# Patient Record
Sex: MALE | Race: WHITE | Employment: OTHER | URBAN - METROPOLITAN AREA
[De-identification: names, ages, dates, MRNs, and addresses within clinical notes are randomized per-mention and may not be internally consistent; named-entity substitution may affect disease eponyms.]

---

## 2018-09-22 ENCOUNTER — HOSPITAL ENCOUNTER (INPATIENT)
Facility: CLINIC | Age: 83
LOS: 2 days | Discharge: HOME | DRG: 390 | End: 2018-09-25
Attending: EMERGENCY MEDICINE | Admitting: HOSPITALIST
Payer: MEDICARE

## 2018-09-22 DIAGNOSIS — K56.609 SBO (SMALL BOWEL OBSTRUCTION) (H): ICD-10-CM

## 2018-09-22 LAB
ALBUMIN SERPL-MCNC: 4.3 G/DL (ref 3.4–5)
ALP SERPL-CCNC: 54 U/L (ref 40–150)
ALT SERPL W P-5'-P-CCNC: 24 U/L (ref 0–70)
ANION GAP SERPL CALCULATED.3IONS-SCNC: 9 MMOL/L (ref 3–14)
AST SERPL W P-5'-P-CCNC: 19 U/L (ref 0–45)
BASOPHILS # BLD AUTO: 0 10E9/L (ref 0–0.2)
BASOPHILS NFR BLD AUTO: 0.2 %
BILIRUB SERPL-MCNC: 0.7 MG/DL (ref 0.2–1.3)
BUN SERPL-MCNC: 19 MG/DL (ref 7–30)
CALCIUM SERPL-MCNC: 9.8 MG/DL (ref 8.5–10.1)
CHLORIDE SERPL-SCNC: 101 MMOL/L (ref 94–109)
CO2 SERPL-SCNC: 26 MMOL/L (ref 20–32)
CREAT SERPL-MCNC: 1.23 MG/DL (ref 0.66–1.25)
DIFFERENTIAL METHOD BLD: ABNORMAL
EOSINOPHIL # BLD AUTO: 0.1 10E9/L (ref 0–0.7)
EOSINOPHIL NFR BLD AUTO: 0.6 %
ERYTHROCYTE [DISTWIDTH] IN BLOOD BY AUTOMATED COUNT: 13.2 % (ref 10–15)
GFR SERPL CREATININE-BSD FRML MDRD: 56 ML/MIN/1.7M2
GLUCOSE SERPL-MCNC: 163 MG/DL (ref 70–99)
HCT VFR BLD AUTO: 47.7 % (ref 40–53)
HGB BLD-MCNC: 16.1 G/DL (ref 13.3–17.7)
IMM GRANULOCYTES # BLD: 0.1 10E9/L (ref 0–0.4)
IMM GRANULOCYTES NFR BLD: 0.5 %
LACTATE BLD-SCNC: 1.3 MMOL/L (ref 0.7–2)
LIPASE SERPL-CCNC: 165 U/L (ref 73–393)
LYMPHOCYTES # BLD AUTO: 1 10E9/L (ref 0.8–5.3)
LYMPHOCYTES NFR BLD AUTO: 5.2 %
MCH RBC QN AUTO: 32.1 PG (ref 26.5–33)
MCHC RBC AUTO-ENTMCNC: 33.8 G/DL (ref 31.5–36.5)
MCV RBC AUTO: 95 FL (ref 78–100)
MONOCYTES # BLD AUTO: 0.9 10E9/L (ref 0–1.3)
MONOCYTES NFR BLD AUTO: 4.8 %
NEUTROPHILS # BLD AUTO: 17.4 10E9/L (ref 1.6–8.3)
NEUTROPHILS NFR BLD AUTO: 88.7 %
NRBC # BLD AUTO: 0 10*3/UL
NRBC BLD AUTO-RTO: 0 /100
PLATELET # BLD AUTO: 295 10E9/L (ref 150–450)
POTASSIUM SERPL-SCNC: 3.9 MMOL/L (ref 3.4–5.3)
PROT SERPL-MCNC: 7.5 G/DL (ref 6.8–8.8)
RBC # BLD AUTO: 5.01 10E12/L (ref 4.4–5.9)
SODIUM SERPL-SCNC: 136 MMOL/L (ref 133–144)
WBC # BLD AUTO: 19.5 10E9/L (ref 4–11)

## 2018-09-22 PROCEDURE — 85025 COMPLETE CBC W/AUTO DIFF WBC: CPT | Performed by: EMERGENCY MEDICINE

## 2018-09-22 PROCEDURE — 80053 COMPREHEN METABOLIC PANEL: CPT | Performed by: EMERGENCY MEDICINE

## 2018-09-22 PROCEDURE — 83605 ASSAY OF LACTIC ACID: CPT | Performed by: EMERGENCY MEDICINE

## 2018-09-22 PROCEDURE — 96375 TX/PRO/DX INJ NEW DRUG ADDON: CPT

## 2018-09-22 PROCEDURE — 25000132 ZZH RX MED GY IP 250 OP 250 PS 637: Mod: GY | Performed by: EMERGENCY MEDICINE

## 2018-09-22 PROCEDURE — 96361 HYDRATE IV INFUSION ADD-ON: CPT

## 2018-09-22 PROCEDURE — A9270 NON-COVERED ITEM OR SERVICE: HCPCS | Mod: GY | Performed by: EMERGENCY MEDICINE

## 2018-09-22 PROCEDURE — 83690 ASSAY OF LIPASE: CPT | Performed by: EMERGENCY MEDICINE

## 2018-09-22 PROCEDURE — 96374 THER/PROPH/DIAG INJ IV PUSH: CPT

## 2018-09-22 PROCEDURE — 25000128 H RX IP 250 OP 636: Performed by: EMERGENCY MEDICINE

## 2018-09-22 PROCEDURE — 99285 EMERGENCY DEPT VISIT HI MDM: CPT | Mod: 25

## 2018-09-22 RX ORDER — ONDANSETRON 2 MG/ML
4 INJECTION INTRAMUSCULAR; INTRAVENOUS ONCE
Status: COMPLETED | OUTPATIENT
Start: 2018-09-22 | End: 2018-09-22

## 2018-09-22 RX ORDER — HYDROMORPHONE HCL/0.9% NACL/PF 0.2MG/0.2
0.2 SYRINGE (ML) INTRAVENOUS ONCE
Status: COMPLETED | OUTPATIENT
Start: 2018-09-22 | End: 2018-09-22

## 2018-09-22 RX ORDER — ACETAMINOPHEN 500 MG
1000 TABLET ORAL ONCE
Status: COMPLETED | OUTPATIENT
Start: 2018-09-22 | End: 2018-09-22

## 2018-09-22 RX ORDER — HYDROMORPHONE HCL/0.9% NACL/PF 0.2MG/0.2
0.2 SYRINGE (ML) INTRAVENOUS
Status: DISCONTINUED | OUTPATIENT
Start: 2018-09-22 | End: 2018-09-25 | Stop reason: HOSPADM

## 2018-09-22 RX ADMIN — Medication 0.2 MG: at 23:11

## 2018-09-22 RX ADMIN — ONDANSETRON 4 MG: 2 INJECTION INTRAMUSCULAR; INTRAVENOUS at 23:11

## 2018-09-22 RX ADMIN — ACETAMINOPHEN 1000 MG: 500 TABLET, FILM COATED ORAL at 23:10

## 2018-09-22 RX ADMIN — SODIUM CHLORIDE, POTASSIUM CHLORIDE, SODIUM LACTATE AND CALCIUM CHLORIDE 500 ML: 600; 310; 30; 20 INJECTION, SOLUTION INTRAVENOUS at 23:11

## 2018-09-22 NOTE — IP AVS SNAPSHOT
MRN:[id number]                      After Visit Summary   9/22/2018    Bryant Crawford    MRN: [id number]           Thank you!     Thank you for choosing [facility] for your care. Our goal is always to provide you with excellent care. Hearing back from our patients is one way we can continue to improve our services. Please take a few minutes to complete the written survey that you may receive in the mail after you visit. If you would like to speak to someone directly about your visit please contact Patient Relations at [phone]. Thank you!          Patient Information     Date Of Birth          1935        Designated Caregiver       Most Recent Value    Caregiver    Will someone help with your care after discharge? yes    Name of designated caregiver [facility] staff    Phone number of caregiver Home phone    Caregiver address [facility]      About your hospital stay     You were admitted on:  September 23, 2018 You last received care in the:  [place] [address] [facility]    You were discharged on:  September 25, 2018       Who to Call     For medical emergencies, please call 911.  For non-urgent questions about your medical care, please call your primary care provider or clinic, [phone]          Attending Provider     Provider Specialty    Kenn Franklin MD Emergency Medicine    [facility], Suzanna Douglas MD Internal Medicine       Primary Care Provider Office Phone # Fax #    Ariel Mario [phone]      After Care Instructions     Activity       Your activity upon discharge: activity as tolerated            Diet       Follow this diet upon discharge: Orders Placed This Encounter      Low Fiber Diet, for 4 days, if no abdominal pain/nausea/vomiting, may advance to regular diet.                  Follow-up Appointments     Follow-up and recommended labs and tests        Follow up with primary care provider, Ariel Mario, within 7  "days for hospital follow- up.  The following labs/tests are recommended: small bowel follow through and liver ultrasound.                  Further instructions from your care team       HOME CARE FOLLOWING BOWEL OBSTRUCTION ADMISSION  TUSHAR Golden, VERA Germain, OTF Katz     ACTIVITY:  Light Activity -- you may immediately be up and about as tolerated.  Driving -- you may drive when comfortable and off narcotic pain medications.  Light Work -- resume when comfortable off pain medications.  (If you can drive, you probably can work.)  Strenuous Work/Activity -- limit lifting to 15 pounds for 1-2 weeks.  Then, progressively increase with time.  Active Sports (running, biking, etc.) -- cautiously resume after 4 weeks, or when cleared by your surgeon.    DISCOMFORT:  Expect gradual improvement of residual abdominal soreness as your bowel function continues to return to normal over the following 1-2 weeks.  Please contact the office if you have worsening of abdominal pain, or onset of nausea/vomiting.    DIET:  Continue on a \"soft\" diet (i.e. cooked vegetables, soups, processed meats, light/white bread, mashed potatoes, rice, yogurt) for the first one to two week after discharge from the hospital.  After this time, you may return to diet you were on before surgery minimizing high cellulose foods and foods with \"skins\" (i.e.grapes, apple, citrus, corn) only.  This would include limiting: brussel sprouts, cabbage & kale, alfalfa sprouts, zucchini, squash, potatoes, carrots, and yams.  Drink plenty of fluids.    SURGEON CONTACT/APPOINTMENTS:  Office Phone:  [phone]     CONTACT US IF THE FOLLOWING DEVELOPS:   1. A fever that is above 101     2. Severe pain that is not relieved by your prescription.        If you have other questions, please call the office Monday thru Friday between 8am and 5pm to discuss with the nurse or physician assistant.  #[phone]    There is a surgeon ON CALL on " "weekday evenings and over the weekend in case of urgent need only, and may be contacted at the same number.    If you are having an emergency, call 911 or proceed to your nearest emergency department.    Pending Results     No orders found from 2018 to 2018.            Statement of Approval     Ordered          18 0822  I have reviewed and agree with all the recommendations and orders detailed in this document.  EFFECTIVE NOW     Approved and electronically signed by:  Dexter Washington MD             Admission Information     Date & Time Department Dept. Phone    2018 [place] [address] [facility] [phone]      Your Vitals Were     Blood Pressure Pulse Temperature Respirations Weight Pulse Oximetry    142/66 (BP Location: Left arm) 86 98.2  F (36.8  C) (Oral) 18 95.3 kg (210 lb 3.2 oz) 99%      MyChart Information     [employer] lets you send messages to your doctor, view your test results, renew your prescriptions, schedule appointments and more. To sign up, go to [email or web address] . Click on \"Log in\" on the left side of the screen, which will take you to the Welcome page. Then click on \"Sign up Now\" on the right side of the page.     You will be asked to enter the access code listed below, as well as some personal information. Please follow the directions to create your username and password.     Your access code is: [email or web address]  Expires: 2018 11:56 PM     Your access code will  in 90 days. If you need help or a new code, please call your [facility] clinic or [phone].        Care EveryWhere ID     This is your Care EveryWhere ID. This could be used by other organizations to access your [facility] medical records  [id number]        Equal Access to Services     JERILYN OLEA : Brandi Carranza, brandyn werner, janette byrne, morenita rodriguez. So [facility] [phone].    ATENCIÓN: Si habla español, tiene a drake disposición " servicios gratuitos de asistencia lingüística. [employer] [phone].    We comply with applicable federal civil rights laws and [place] laws. We do not discriminate on the basis of race, color, national origin, age, disability, sex, sexual orientation, or gender identity.               Review of your medicines      CONTINUE these medicines which have NOT CHANGED        Dose / Directions    aspirin 81 MG EC tablet        Dose:  81 mg   Take 81 mg by mouth daily   Refills:  0       hydrochlorothiazide 25 MG tablet   Commonly known as:  HYDRODIURIL        Dose:  25 mg   Take 25 mg by mouth daily.   Refills:  0       lisinopril 40 MG tablet   Commonly known as:  PRINIVIL/ZESTRIL        Dose:  40 mg   Take 40 mg by mouth daily.   Refills:  0                Protect others around you: Learn how to safely use, store and throw away your medicines at www.disposemymeds.org.             Medication List: This is a list of all your medications and when to take them. Check marks below indicate your daily home schedule. Keep this list as a reference.      Medications           Morning Afternoon Evening Bedtime As Needed    aspirin 81 MG EC tablet   Take 81 mg by mouth daily   Next Dose Due:  9/26                                   hydrochlorothiazide 25 MG tablet   Commonly known as:  HYDRODIURIL   Take 25 mg by mouth daily.   Next Dose Due:  9/26                                   lisinopril 40 MG tablet   Commonly known as:  PRINIVIL/ZESTRIL   Take 40 mg by mouth daily.   Next Dose Due:  9/26                                             More Information        Small Bowel Obstruction     Small bowel obstruction can lead to tissue damage and even tissue death.   A small bowel obstruction occurs when part or all of the small intestine (bowel) is blocked. As a result, digestive contents can t move through the bowel properly and out of the body. Treatment is needed right away to remove the blockage. This can ease painful symptoms. It  can also prevent serious problems, such as tissue death or bursting (rupture) of the small bowel. Without treatment, a small bowel obstruction can be fatal.  Causes of small bowel obstruction  A small bowel obstruction can be caused by:    Scar tissue (adhesions). These may form after belly (abdominal) surgery or an infection.    Hernia. A hernia is when an organ pushes through a weak spot or tear in the abdomen wall. Part of the small bowel can push out and be seen as a bulge under the belly. Hernias can also occur internally.    Certain health problems. These include when part of the bowel slides inside another part (intussusception). Other causes include irritable bowel disease such as Crohn s disease, and inflammation and sores in the intestine (ulcerative colitis).    Abnormal tissue growths (tumors). These can form on the inside or outside of the small bowel. They are usually due to cancer.  Symptoms of small bowel obstruction  Common symptoms include:    Belly cramping and pain    Belly swelling and bloating    Upset stomach (nausea) and vomiting    Can't  pass gas    Can't pass stool (constipation)    Diarrhea  Diagnosing small bowel obstruction  Your provider will ask about your symptoms and health history. You ll also have a physical exam. Tests may also be done to confirm the problem. These can include:    Imaging tests. These provide pictures of the small bowel. Common tests include X-rays and a CT scan.    Blood tests. These check for infection and other problems, such as excess fluid loss (dehydration).    Upper GI (gastrointestinal) series with a small bowel follow-through. This test takes X-rays of the upper digestive tract from the mouth through the small bowel. An X-ray dye (contrast fluid) is used. The dye coats the inside of your upper digestive tract so it will show up clearly on X-rays.  Treating small bowel obstruction  Treatment takes place in a hospital. As part of your care, the following  may be done:    No food or drink is given by mouth. This allows your bowels to rest.    An IV (intravenous) line is placed in a vein in your arm or hand. The IV line is used to give fluids. It may also be used to give medicines. These may be needed to ease pain, nausea, and other symptoms. They may also be needed to treat or prevent infections.    A soft, thin, flexible tube (nasogastric tube) is inserted through your nose and into your stomach. The tube is used to remove extra gas and fluid in your stomach and bowels. This helps to ease symptoms such as pain and swelling.    In severe cases, surgery is done. This may be needed if the small bowel is almost or totally blocked, or there is a hole in the bowel (bowel perforation). During surgery, the blockage is removed. Parts of the bowel may also be removed if there is tissue death. Other repair may be done as well, depending on what caused the blockage. Your healthcare provider will give you more information about surgery, if needed.    You ll be watched closely in the hospital until your symptoms improve. Your provider will tell you when you can go home.  Long-term concerns   After treatment, most people recover with no lasting effects. If a long part of the bowel is removed, there is a greater chance for lifelong digestive problems. Bowel movements may become irregular. Work with your provider to learn the best ways to manage any symptoms you may have, and to protect your health.  When to call your healthcare provider  Call your provider right away if you have any of the following:    Severe pain (call 911)    Belly swelling or cramping that won t go away    Can t pass stool or gas    Nausea or vomiting (especially if the vomit looks or smells like stool)   Date Last Reviewed: 7/1/2016 2000-2017 [employer]. [address] [place] [address], [place] [address]. All rights reserved. This information is not intended as a substitute for professional medical  care. Always follow your healthcare professional's instructions.

## 2018-09-22 NOTE — IP AVS SNAPSHOT
[place] [address] [facility]    [address]    [facility] [address]    Phone:  [phone]    Fax:  [phone]                                       After Visit Summary   9/22/2018    Bryant Crawford    MRN: [id number]           After Visit Summary Signature Page     I have received my discharge instructions, and my questions have been answered. I have discussed any challenges I see with this plan with the nurse or doctor.    ..........................................................................................................................................  Patient/Patient Representative Signature      ..........................................................................................................................................  Patient Representative Print Name and Relationship to Patient    ..................................................               ................................................  Date                                   Time    ..........................................................................................................................................  Reviewed by Signature/Title    ...................................................              ..............................................  Date                                               Time          22EPIC Rev 08/18

## 2018-09-23 ENCOUNTER — APPOINTMENT (OUTPATIENT)
Dept: CT IMAGING | Facility: CLINIC | Age: 83
DRG: 390 | End: 2018-09-23
Attending: EMERGENCY MEDICINE
Payer: MEDICARE

## 2018-09-23 PROBLEM — K56.609 SBO (SMALL BOWEL OBSTRUCTION) (H): Status: ACTIVE | Noted: 2018-09-23

## 2018-09-23 LAB — LACTATE BLD-SCNC: 1 MMOL/L (ref 0.7–2)

## 2018-09-23 PROCEDURE — 83605 ASSAY OF LACTIC ACID: CPT | Performed by: HOSPITALIST

## 2018-09-23 PROCEDURE — 36415 COLL VENOUS BLD VENIPUNCTURE: CPT | Performed by: HOSPITALIST

## 2018-09-23 PROCEDURE — 99223 1ST HOSP IP/OBS HIGH 75: CPT | Mod: AI | Performed by: HOSPITALIST

## 2018-09-23 PROCEDURE — 25000128 H RX IP 250 OP 636: Performed by: EMERGENCY MEDICINE

## 2018-09-23 PROCEDURE — 74177 CT ABD & PELVIS W/CONTRAST: CPT

## 2018-09-23 PROCEDURE — 25800025 ZZH RX 258: Performed by: HOSPITALIST

## 2018-09-23 PROCEDURE — 99221 1ST HOSP IP/OBS SF/LOW 40: CPT | Performed by: SURGERY

## 2018-09-23 PROCEDURE — 12000007 ZZH R&B INTERMEDIATE

## 2018-09-23 RX ORDER — ASPIRIN 81 MG/1
81 TABLET ORAL DAILY
COMMUNITY

## 2018-09-23 RX ORDER — PROCHLORPERAZINE MALEATE 5 MG
5 TABLET ORAL EVERY 6 HOURS PRN
Status: DISCONTINUED | OUTPATIENT
Start: 2018-09-23 | End: 2018-09-25 | Stop reason: HOSPADM

## 2018-09-23 RX ORDER — DEXTROSE MONOHYDRATE, SODIUM CHLORIDE, AND POTASSIUM CHLORIDE 50; 1.49; 4.5 G/1000ML; G/1000ML; G/1000ML
INJECTION, SOLUTION INTRAVENOUS CONTINUOUS
Status: DISCONTINUED | OUTPATIENT
Start: 2018-09-23 | End: 2018-09-25

## 2018-09-23 RX ORDER — NALOXONE HYDROCHLORIDE 0.4 MG/ML
.1-.4 INJECTION, SOLUTION INTRAMUSCULAR; INTRAVENOUS; SUBCUTANEOUS
Status: DISCONTINUED | OUTPATIENT
Start: 2018-09-23 | End: 2018-09-25 | Stop reason: HOSPADM

## 2018-09-23 RX ORDER — ONDANSETRON 4 MG/1
4 TABLET, ORALLY DISINTEGRATING ORAL EVERY 6 HOURS PRN
Status: DISCONTINUED | OUTPATIENT
Start: 2018-09-23 | End: 2018-09-25 | Stop reason: HOSPADM

## 2018-09-23 RX ORDER — ONDANSETRON 2 MG/ML
4 INJECTION INTRAMUSCULAR; INTRAVENOUS EVERY 6 HOURS PRN
Status: DISCONTINUED | OUTPATIENT
Start: 2018-09-23 | End: 2018-09-25 | Stop reason: HOSPADM

## 2018-09-23 RX ORDER — IOPAMIDOL 755 MG/ML
500 INJECTION, SOLUTION INTRAVASCULAR ONCE
Status: COMPLETED | OUTPATIENT
Start: 2018-09-23 | End: 2018-09-23

## 2018-09-23 RX ORDER — HYDROMORPHONE HYDROCHLORIDE 1 MG/ML
0.2 INJECTION, SOLUTION INTRAMUSCULAR; INTRAVENOUS; SUBCUTANEOUS
Status: DISCONTINUED | OUTPATIENT
Start: 2018-09-23 | End: 2018-09-25 | Stop reason: HOSPADM

## 2018-09-23 RX ORDER — PROCHLORPERAZINE 25 MG
12.5 SUPPOSITORY, RECTAL RECTAL EVERY 12 HOURS PRN
Status: DISCONTINUED | OUTPATIENT
Start: 2018-09-23 | End: 2018-09-25 | Stop reason: HOSPADM

## 2018-09-23 RX ORDER — BISACODYL 10 MG
10 SUPPOSITORY, RECTAL RECTAL DAILY PRN
Status: DISCONTINUED | OUTPATIENT
Start: 2018-09-23 | End: 2018-09-25 | Stop reason: HOSPADM

## 2018-09-23 RX ADMIN — IOPAMIDOL 100 ML: 755 INJECTION, SOLUTION INTRAVENOUS at 00:31

## 2018-09-23 RX ADMIN — POTASSIUM CHLORIDE, DEXTROSE MONOHYDRATE AND SODIUM CHLORIDE: 150; 5; 450 INJECTION, SOLUTION INTRAVENOUS at 03:13

## 2018-09-23 RX ADMIN — SODIUM CHLORIDE 65 ML: 9 INJECTION, SOLUTION INTRAVENOUS at 00:31

## 2018-09-23 RX ADMIN — POTASSIUM CHLORIDE, DEXTROSE MONOHYDRATE AND SODIUM CHLORIDE: 150; 5; 450 INJECTION, SOLUTION INTRAVENOUS at 13:30

## 2018-09-23 ASSESSMENT — ENCOUNTER SYMPTOMS
VOMITING: 1
NAUSEA: 1
CHILLS: 1
ABDOMINAL DISTENTION: 1
HEMATURIA: 0
DYSURIA: 0
ABDOMINAL PAIN: 1
COLOR CHANGE: 0

## 2018-09-23 ASSESSMENT — ACTIVITIES OF DAILY LIVING (ADL)
ADLS_ACUITY_SCORE: 9

## 2018-09-23 NOTE — ED NOTES
[facility] [place]  ED Nurse Handoff Report    Bryant Crawford is a 83 year old male   ED Chief complaint: Abdominal Pain  . ED Diagnosis:   Final diagnoses:   SBO (small bowel obstruction)     Allergies: No Known Allergies    Code Status: Full Code  Activity level - Baseline/Home:  Independent. Activity Level - Current:   Independent. Lift room needed: No. Bariatric: No   Needed: No   Isolation: No. Infection: Not Applicable.     Vital Signs:   Vitals:    09/22/18 2257 09/22/18 2351   BP: 156/89    Pulse: 86    Resp: 16    Temp: 98.2  F (36.8  C)    TempSrc: Oral    SpO2: 97%    Weight:  95.3 kg (210 lb)       Cardiac Rhythm:  ,      Pain level:    Patient confused: No. Patient Falls Risk: No.   Elimination Status: has not voided during ED visit   Patient Report - Initial Complaint: abdominal pain. Focused Assessment: lower abdominal pain with nausea  Tests Performed:   Labs Ordered and Resulted from Time of ED Arrival Up to the Time of Departure from the ED   CBC WITH PLATELETS DIFFERENTIAL - Abnormal; Notable for the following:        Result Value    WBC 19.5 (*)     Absolute Neutrophil 17.4 (*)     All other components within normal limits   COMPREHENSIVE METABOLIC PANEL - Abnormal; Notable for the following:     Glucose 163 (*)     GFR Estimate 56 (*)     All other components within normal limits   LIPASE   LACTIC ACID WHOLE BLOOD   ROUTINE UA WITH MICROSCOPIC   ISTAT CREATININE NURSING POCT   NASOGASTRIC TUBE DECOMPRESSION     Abd/pelvis CT,  IV  contrast only TRAUMA / AAA   Preliminary Result   IMPRESSION:   1. Mechanical small bowel obstruction with a transition point in the   ileum, near the inferior tip of the liver. The small bowel mucosa   immediately distal to the transition point is thickened. Small bowel   thickening at this point is nonspecific and may be related to edema or   enteritis. An obstructive lesion such as small bowel lymphoma cannot   be excluded.   2. Low-density lesion  in the left lobe of the liver may be the small   residual of a previous cyst in this region. Consider further   evaluation with ultrasound to rule out a solid liver lesion.         Abnormal Results: see above  Treatments provided: see MAR, NG tube insertion  Family Comments: not present  OBS brochure/video discussed/provided to patient:  N/A  ED Medications:   Medications   HYDROmorphone (DILAUDID) injection 0.2 mg (not administered)   lactated ringers BOLUS 500 mL (0 mLs Intravenous Stopped 9/23/18 0127)   ondansetron (ZOFRAN) injection 4 mg (4 mg Intravenous Given 9/22/18 2311)   acetaminophen (TYLENOL) tablet 1,000 mg (1,000 mg Oral Given 9/22/18 2310)   HYDROmorphone (DILAUDID) injection 0.2 mg (0.2 mg Intravenous Given 9/22/18 2311)   0.9% sodium chloride BOLUS (0 mLs Intravenous Stopped 9/23/18 0035)   iopamidol (ISOVUE-370) solution 500 mL (100 mLs Intravenous Given 9/23/18 0031)     Drips infusing:  No  For the majority of the shift, the patient's behavior Green. Interventions performed were none.     Severe Sepsis OR Septic Shock Diagnosis Present: No      ED Nurse Name/Phone Number: Ayden Moise,   1:40 AM    RECEIVING UNIT ED HANDOFF REVIEW    Above ED Nurse Handoff Report was reviewed: Yes  Reviewed by: Myesha Millard on September 23, 2018 at 2:24 AM

## 2018-09-23 NOTE — H&P
[facility]  Hospitalist Admission Note  Name: Bryant Crawford    MRN: [id number]  YOB: 1935    Age: 83 year old  Date of admission: 9/22/2018              Brief patient summary: Patient is a 83-year-old male with history of hypertension, CAD status post PCI in 1995 who presented on 9/23/2018 with abdominal pain and was found to have small bowel obstruction.         Assessment and Plan:   Small bowel obstruction: Patient developed acute onset abdominal pain and distention 14 hours prior to admission.  CT showed multiple dilated loops of small bowel with transition point in the ileum concerning for mechanical small bowel obstruction.  Patient has no history of any abdominal surgeries in the past, no history of bowel obstruction or bowel disease however chart review indicates prior bowel obstruction in 2008 and history of intermittent bowel obstructions thought to be secondary to colitis or enteritis are some congenital abnormality.  Surgery was consulted from the ER and recommended conservative management  --We will obtain formal surgery consult in the morning  --Continue NG tube decompression, patient feels much relieved with this  --N.p.o., IV fluids, antiemetics, pain control with IV Dilaudid as needed    Hypertension: Hold lisinopril 40 mg daily and hydrochlorothiazide 25 mg  History of coronary disease status post PCI to LAD in 1995: Holding all oral medications.  Not on a beta-blocker    DVT Prophylaxis: Pneumatic Compression Devices  Discharge Dispo: home  Estimated Disch Date / # of Days until Disch: Uncertain  Full Code    Chief Complaint: Abdominal pain and distention         History of Present Illness:   Discussed with ER physician : Dr. Franklin    Bryant Crawford is a 83 year old male who presents with abdominal pain since then in the morning associated with nausea and vomiting.  He also developed significant distention.  Patient reports having no prior history of small bowel  obstruction however chart review indicates he had admission for small bowel obstruction in 2008 and has had intermittent bouts of bowel obstructions even prior to that thought to be secondary to intermittent enteritis are possible undiagnosed congenital abnormality  Patient denies any fever, chills, diarrhea.  Had nausea which has now resolved with NG tube decompression         Past Medical History:     Patient Active Problem List   Diagnosis     BCC (basal cell carcinoma), face      Past Medical History:   Diagnosis Date     Hypertension                 Past Surgical History:     Past Surgical History:   Procedure Laterality Date     CARDIAC SURGERY                 Home Medications:     Prior to Admission medications    Medication Sig Last Dose Taking? Auth Provider   ASPIRIN PO Take 81 mg by mouth   Reported, Patient   hydrochlorothiazide (HYDRODIURIL) 25 MG tablet Take 25 mg by mouth daily.   Reported, Patient   lisinopril (PRINIVIL,ZESTRIL) 40 MG tablet Take 40 mg by mouth daily.   Reported, Patient            Allergies:   No Known Allergies         Social History:     Social History     Social History     Marital status:      Spouse name: N/A     Number of children: N/A     Years of education: N/A     Occupational History     Not on file.     Social History Main Topics     Smoking status: Former Smoker     Smokeless tobacco: Not on file     Alcohol use Yes      Comment: rare     Drug use: No     Sexual activity: Not on file     Other Topics Concern     Not on file     Social History Narrative                   Family History:   Father was diagnosed with colon cancer at the age of 95  Mother had CVA               Review of Systems:   The 10 point Review of Systems is negative other than noted in the HPI.               Physical Exam:     Heart Rate: 86, Blood pressure 118/71, pulse 86, temperature 98.2  F (36.8  C), temperature source Oral, resp. rate 16, weight 95.3 kg (210 lb), SpO2 93 %.  210 lbs 0  oz    General: Alert, awake, no acute distress.  Sleepy, NG tube in place  HEENT: NC/AT, eyes anicteric, external occular movements intact, face symmetric.  Dentition WNL, MM moist.  Cardiac: RRR, S1, S2.  No murmurs appreciated.  Pulmonary: Normal chest rise, normal work of breathing.  Lungs CTA BL  Abdomen: soft,distended.  Nontender, bowel Sounds absent.  No guarding.  Extremities: no deformities.  Warm, well perfused.  Skin: no rashes or lesions noted.  Warm and Dry.  Neuro: No focal deficits noted.  Speech clear.  Coordination and strength grossly normal.  Psych: Appropriate affect.         Data:   All new lab and imaging data was reviewed.    Recent Labs  Lab 09/22/18  2301   WBC 19.5*   HGB 16.1   HCT 47.7   MCV 95          Recent Labs  Lab 09/22/18  2301      POTASSIUM 3.9   CHLORIDE 101   CO2 26   ANIONGAP 9   *   BUN 19   CR 1.23   GFRESTIMATED 56*   GFRESTBLACK 68   ALIYA 9.8         Imaging:  Recent Results (from the past 48 hour(s))   Abd/pelvis CT,  IV  contrast only TRAUMA / AAA    Narrative    CT ABDOMEN/PELVIS WITH CONTRAST   9/23/2018 12:37 AM     HISTORY: Lower abdominal pain x12 hours, nausea and distention.     TECHNIQUE: CT images of the abdomen and pelvis following nonionic  intravenous contrast, 100 mL Isovue-370. Radiation dose for this scan  was reduced using automated exposure control, adjustment of the mA  and/or kV according to patient size, or iterative reconstruction  technique.    COMPARISON: 11/13/2008.    FINDINGS: 3 mm nodule at the left lung base is unchanged. Low-density  lesion in the left lobe of the liver measures 1.3 cm (series 2, image  17) and is in the area of a large cyst on the prior study. No other  liver lesions. There is trace perihepatic ascites. The gallbladder is  decompressed. The spleen, pancreas, and right adrenal gland are  unremarkable. Subcentimeter nodular thickening of the left adrenal  gland is stable. No hydronephrosis of either kidney.  Several  parapelvic cysts are noted. No evidence of solid renal mass. No  abdominal or retroperitoneal lymphadenopathy.    There are multiple dilated loops of small bowel. The colon is  decompressed. There is transition from dilated to nondilated small  bowel near the inferior tip of the liver (series 2, images 38-45).    There is sigmoid diverticulosis without evidence of diverticulitis. No  pelvic lymphadenopathy or free fluid. The prostate is enlarged.      Impression    IMPRESSION:  1. Mechanical small bowel obstruction with a transition point in the  ileum, near the inferior tip of the liver. The small bowel mucosa  immediately distal to the transition point is thickened. Small bowel  thickening at this point is nonspecific and may be related to edema or  enteritis. An obstructive lesion such as small bowel lymphoma cannot  be excluded.  2. Low-density lesion in the left lobe of the liver may be the small  residual of a previous cyst in this region. Consider further  evaluation with ultrasound to rule out a solid liver lesion.    MD Suzanna GARCIA MD  Hospitalist  [facility] [address] [place] [address] [phone]

## 2018-09-23 NOTE — PLAN OF CARE
Problem: Bowel Obstruction (Adult)  Goal: Signs and Symptoms of Listed Potential Problems Will be Absent, Minimized or Managed (Bowel Obstruction)  Signs and symptoms of listed potential problems will be absent, minimized or managed by discharge/transition of care (reference Bowel Obstruction (Adult) CPG).  Outcome: Improving  Pt alert and oriented X 4. Apical pulse regular. Lung sounds clear throughout all lobes. NG tube in place, brown drainage. Pt denies pain or nausea. Up ad steven, ambulating in hallways. 400cc brown drainage from NG this shift. Pt denies pain or nausea this shift.   Plan: Continue NG and bowel rest, IV fluids infusing, Abd Xray 9/24/18.     Pt reports passing small amount of flatus while walking hallways.

## 2018-09-23 NOTE — ED PROVIDER NOTES
History     Chief Complaint:  Abdominal Pain    HPI   Bryant Crawford is a 82 year old male with a history of hypertension and BCC who presents to the ED for evaluation of abdominal pain, gradually worsening and constant since 1000 this morning. He additionally has associated nausea and vomiting, and subjective chills. He feels distended as well. He denies any dysuria, hematuria, melena, or hematochezia. He has no history of abdominal surgery. He presented to the ED this evening due to the pain not improving on its own.    Allergies:  NKDA    Medications:    Aspirin  Hydrochlorothiazide  Lisinopril    Past Medical History:    HTN  BCC    Past Surgical History:    Cardiac Surgery    Family History:    No past pertinent family history.    Social History:  Marital Status:   [2]  Former Smoker  Rare alcohol use     Review of Systems   Constitutional: Positive for chills (subjective).   Gastrointestinal: Positive for abdominal distention, abdominal pain, nausea and vomiting.   Genitourinary: Negative for dysuria and hematuria.   Skin: Negative for color change.   All other systems reviewed and are negative.    Physical Exam     Patient Vitals for the past 24 hrs:   BP Temp Temp src Pulse Heart Rate Resp SpO2 Weight   09/23/18 0131 118/71 - - - - - 93 % -   09/22/18 2351 - - - - - - - 95.3 kg (210 lb)   09/22/18 2257 156/89 98.2  F (36.8  C) Oral 86 86 16 97 % -     Physical Exam   HENT:   Right Ear: External ear normal.   Left Ear: External ear normal.   Nose: Nose normal.   Eyes: Conjunctivae and lids are normal.   Neck: Neck supple. No tracheal deviation present.   Cardiovascular: Regular rhythm and intact distal pulses.    Pulmonary/Chest: Breath sounds normal. No respiratory distress.   Abdominal: Soft. He exhibits distension (mild). There is tenderness (+ ttp mid abd and epigastrium, no peritonitis). There is no rebound and no guarding.   Musculoskeletal:   No peripheral edema   Neurological:   MAEE, no  gross focal motor or sensory deficit   Skin: Skin is warm and dry. He is not diaphoretic.   Psychiatric: He has a normal mood and affect.   Nursing note and vitals reviewed.        Emergency Department Course     Imaging:  Radiographic findings were communicated with the patient who voiced understanding of the findings.  CT Abdomen/Pelvis with IV contrast:   1. Mechanical small bowel obstruction with a transition point in the ileum, near the inferior tip of the liver. The small bowel mucosa immediately distal to the transition point is thickened. Small bowel thickening at this point is nonspecific and may be related to edema or enteritis. An obstructive lesion such as small bowel lymphoma cannot be excluded.  2. Low-density lesion in the left lobe of the liver may be the small residual of a previous cyst in this region. Consider further evaluation with ultrasound to rule out a solid liver lesion, as per radiology.    Laboratory:  CBC: WBC: 19.5 (H), HGB: 16.1, PLT: 295  CMP: Glucose 163 (H), GFR 56 (L), o/w WNL (Creatinine: 1.23)    Lipase: 165    2301 Lactic acid: 1.3    UA with Microscopic: pending on admission    Interventions:  2310 Tylenol 1000 mg PO  2311 LR 0.5L IV   Zofran, 4 mg, IV injection   Dilaudid, 0.2 mg, IV injection  Please see MAR for full list of medications administered in the ED.    Emergency Department Course:  Nursing notes and vitals reviewed. (2300) I performed an exam of the patient as documented above.     IV inserted. Medicine administered as documented above. Blood drawn. This was sent to the lab for further testing, results above.    The patient provided a urine sample here in the emergency department. This was sent for laboratory testing, findings above.     The patient was sent for an Abdomen/Pelivs CT while in the emergency department, findings above.     (0115) I rechecked the patient and discussed the results of his workup thus far.     (0118) I consulted with Dr. Jones, Surgery,  regarding the patient's history and presentation here in the emergency department.    NG tube placed here in the ED.    (0142)  I consulted with Dr. Hernandez of the hospitalist services. They are in agreement to accept the patient for admission.    Findings and plan explained to the Patient who consents to admission. Discussed the patient with Dr. Hernandez, who will admit the patient to a bed for further monitoring, evaluation, and treatment.    Impression & Plan      Medical Decision Making:  Bryant Crawford is a 83 year old male who presents with abdominal pain. His workup here is consistent with a small bowel obstruction. There is no evidence of perforation. There is a small amount of free fluid. Leukocytosis but normal lactic acid. We will plan for admission here to the hospital. No significant gastric distention.    I spoke with the general surgeon, who looked at the CT scan. He would prefer an NG tube be placed, which will be done and he will be admitted to the hospitalist service. No emergent surgical intervention is planned at this time.    Diagnosis:    ICD-10-CM    1. SBO (small bowel obstruction) K56.609      Disposition:  Admitted to Dr. Hernandez    Scribe Disclosure:  I, Stephanie Augustine, am serving as a scribe on 9/22/2018 at 10:59 PM to personally document services performed by Kenn Franklin, * based on my observations and the provider's statements to me.     Stephanie Augustine  9/22/2018   [facility] EMERGENCY DEPARTMENT       Kenn Franklin MD  09/23/18 0702

## 2018-09-23 NOTE — CONSULTS
Chief complaint:  Abdominal pain    HPI:  This patient is a 83 year old male with a history of bowel obstructions, most recently in 2008 who presents with abdominal pain.  This began about 24 hours ago.  The patient has had no abdominal surgeries in the past.  He was seen here in 2008 by Dr. Stanton for small bowel obstruction, but that resolved nonsurgically.  The question has been raised whether he might have some sort of enteritis or Crohn's disease.  The patient denies any recent problems.  His pain has now almost completely resolved.    Past Medical History:   has a past medical history of Hypertension.    Past Surgical History:  Past Surgical History:   Procedure Laterality Date     CARDIAC SURGERY          Social History:  Social History     Social History     Marital status:      Spouse name: N/A     Number of children: N/A     Years of education: N/A     Occupational History     Not on file.     Social History Main Topics     Smoking status: Former Smoker     Smokeless tobacco: Not on file     Alcohol use Yes      Comment: rare     Drug use: No     Sexual activity: Not on file     Other Topics Concern     Not on file     Social History Narrative        Family History:  No family history on file.    Review of Systems:  The 10 point Review of Systems is negative other than noted in the HPI and above.    Physical Exam:  General - This is a well developed, well nourished male in no apparent distress.  HEENT - Normocephalic, atraumatic.  No scleral icterus.  Neck - supple without masses  Lungs - clear to ascultation.    Heart - Regular rate & rhythm without murmur  Abdomen:  Soft and nontender.  Bowel sounds are normal.  Extremities - warm without edema  Neurologic - nonfocal  Skin shows no evidence of jaundice.  The patient is alert and oriented ×3.    Relevant labs:  White blood cell count last evening was elevated at 19,500.  Lactic acid was normal at 1.0.    Imaging:  CT scan shows what appears to be a  small bowel obstruction with a possible transition point laterally on the right.  There are couple of areas where the small bowel looked somewhat thickened.  There was significant dilatation of the small bowel centrally.    Assessment and Plan:  This is a patient with a fairly significant appearing small bowel obstruction on CT scan.  The patient initially had fairly severe pain, but this has now completely resolved.  Given the resolution of the patient's symptoms, I don't think there is any urgent need for surgical intervention.  Past for GI with small bowel follow-through studies apparently were negative.  I do think it might be worth repeating that once the patient has resolved his current symptoms.  If he does not resolve and get to the point where he can be eating in the next couple of days, I think we might need to consider surgical intervention.  At this point, however, it appears that he has resolved the cause of his pain.  I'm hopeful that he will start passing gas and stool within the next day or so.  We will follow the patient with you.    Julian Jones MD  [facility]

## 2018-09-23 NOTE — PLAN OF CARE
Problem: Patient Care Overview  Goal: Plan of Care/Patient Progress Review  Outcome: Improving  09/23/18 3:53 AM  Pt arrived to the floor from the ED via stretcher.  Ambulated to the bed independently.  Denies pain. VSS.  A&Ox4.   NG tube in place.  Hooked him up to low continuous suction.  Completed profile.  Started IVF to right forearm.  Lungs clear.  Hypoactive BS, last BM 9/22/18.  NPO, can have ice chips.  Urinal at bedside for pt to void.  Will be SBA due to NG tube.  Plan:  Collect UA, IVF, NPO, NG tube, General Surg consult.  Comes from [facility] where he lives with his wife.  Will continue to monitor.      09/23/18 5:50 AM  Pt refusing the flu vaccine.  States he'll get it at [facility].

## 2018-09-23 NOTE — PHARMACY-ADMISSION MEDICATION HISTORY
Admission medication history interview status for this patient is complete. See Psychiatric admission navigator for allergy information, prior to admission medications and immunization status.     Medication history interview source(s):Patient  Medication history resources (including written lists, pill bottles, clinic record): Chart Review - [facility] Care Everywhere, SureScripts fill history.  Primary pharmacy: [facility] [place], [place] - [address] [address]    Changes made to PTA medication list:  Added: None  Deleted: None  Changed: None    Actions taken by pharmacist (provider contacted, etc): None     Additional medication history information: None    Medication reconciliation/reorder completed by provider prior to medication history? No    Do you take OTC medications (eg tylenol, ibuprofen, fish oil, eye/ear drops, etc)? Patient denies use of OTCs, herbal/vitamin supplements.       Prior to Admission medications    Medication Sig Last Dose Taking? Auth Provider   aspirin 81 MG EC tablet Take 81 mg by mouth daily 9/22/2018 at AM Yes Unknown, Entered By History   hydrochlorothiazide (HYDRODIURIL) 25 MG tablet Take 25 mg by mouth daily. 9/22/2018 at AM Yes Reported, Patient   lisinopril (PRINIVIL,ZESTRIL) 40 MG tablet Take 40 mg by mouth daily. 9/22/2018 at AM Yes Reported, Patient         Adonay Martin, PharmD./PGY-1 Resident

## 2018-09-23 NOTE — ED NOTES
Bed: ED08  Expected date: 9/22/18  Expected time: 10:46 PM  Means of arrival: Ambulance  Comments:  [phone]

## 2018-09-24 ENCOUNTER — APPOINTMENT (OUTPATIENT)
Dept: GENERAL RADIOLOGY | Facility: CLINIC | Age: 83
DRG: 390 | End: 2018-09-24
Attending: SURGERY
Payer: MEDICARE

## 2018-09-24 LAB
ALBUMIN SERPL-MCNC: 3.2 G/DL (ref 3.4–5)
ALP SERPL-CCNC: 43 U/L (ref 40–150)
ALT SERPL W P-5'-P-CCNC: 15 U/L (ref 0–70)
ANION GAP SERPL CALCULATED.3IONS-SCNC: 4 MMOL/L (ref 3–14)
AST SERPL W P-5'-P-CCNC: 11 U/L (ref 0–45)
BASOPHILS # BLD AUTO: 0 10E9/L (ref 0–0.2)
BASOPHILS NFR BLD AUTO: 0.2 %
BILIRUB DIRECT SERPL-MCNC: 0.2 MG/DL (ref 0–0.2)
BILIRUB SERPL-MCNC: 0.6 MG/DL (ref 0.2–1.3)
BUN SERPL-MCNC: 12 MG/DL (ref 7–30)
CALCIUM SERPL-MCNC: 8.4 MG/DL (ref 8.5–10.1)
CHLORIDE SERPL-SCNC: 105 MMOL/L (ref 94–109)
CO2 SERPL-SCNC: 30 MMOL/L (ref 20–32)
CREAT SERPL-MCNC: 1.21 MG/DL (ref 0.66–1.25)
DIFFERENTIAL METHOD BLD: ABNORMAL
EOSINOPHIL # BLD AUTO: 0.1 10E9/L (ref 0–0.7)
EOSINOPHIL NFR BLD AUTO: 0.8 %
ERYTHROCYTE [DISTWIDTH] IN BLOOD BY AUTOMATED COUNT: 13.1 % (ref 10–15)
GFR SERPL CREATININE-BSD FRML MDRD: 58 ML/MIN/1.7M2
GLUCOSE SERPL-MCNC: 114 MG/DL (ref 70–99)
HCT VFR BLD AUTO: 42.9 % (ref 40–53)
HGB BLD-MCNC: 14.1 G/DL (ref 13.3–17.7)
IMM GRANULOCYTES # BLD: 0.1 10E9/L (ref 0–0.4)
IMM GRANULOCYTES NFR BLD: 0.5 %
INR PPP: 1.12 (ref 0.86–1.14)
LYMPHOCYTES # BLD AUTO: 1.2 10E9/L (ref 0.8–5.3)
LYMPHOCYTES NFR BLD AUTO: 9.4 %
MCH RBC QN AUTO: 32 PG (ref 26.5–33)
MCHC RBC AUTO-ENTMCNC: 32.9 G/DL (ref 31.5–36.5)
MCV RBC AUTO: 98 FL (ref 78–100)
MONOCYTES # BLD AUTO: 1.1 10E9/L (ref 0–1.3)
MONOCYTES NFR BLD AUTO: 8.6 %
NEUTROPHILS # BLD AUTO: 10.4 10E9/L (ref 1.6–8.3)
NEUTROPHILS NFR BLD AUTO: 80.5 %
NRBC # BLD AUTO: 0 10*3/UL
NRBC BLD AUTO-RTO: 0 /100
PLATELET # BLD AUTO: 253 10E9/L (ref 150–450)
POTASSIUM SERPL-SCNC: 4.2 MMOL/L (ref 3.4–5.3)
PROT SERPL-MCNC: 6 G/DL (ref 6.8–8.8)
RBC # BLD AUTO: 4.4 10E12/L (ref 4.4–5.9)
SODIUM SERPL-SCNC: 139 MMOL/L (ref 133–144)
WBC # BLD AUTO: 13 10E9/L (ref 4–11)

## 2018-09-24 PROCEDURE — 85610 PROTHROMBIN TIME: CPT | Performed by: HOSPITALIST

## 2018-09-24 PROCEDURE — 85025 COMPLETE CBC W/AUTO DIFF WBC: CPT | Performed by: HOSPITALIST

## 2018-09-24 PROCEDURE — 74019 RADEX ABDOMEN 2 VIEWS: CPT

## 2018-09-24 PROCEDURE — 12000007 ZZH R&B INTERMEDIATE

## 2018-09-24 PROCEDURE — 25800025 ZZH RX 258: Performed by: HOSPITALIST

## 2018-09-24 PROCEDURE — 80076 HEPATIC FUNCTION PANEL: CPT | Performed by: HOSPITALIST

## 2018-09-24 PROCEDURE — 80048 BASIC METABOLIC PNL TOTAL CA: CPT | Performed by: HOSPITALIST

## 2018-09-24 PROCEDURE — 99232 SBSQ HOSP IP/OBS MODERATE 35: CPT | Performed by: INTERNAL MEDICINE

## 2018-09-24 PROCEDURE — 36415 COLL VENOUS BLD VENIPUNCTURE: CPT | Performed by: HOSPITALIST

## 2018-09-24 PROCEDURE — 99231 SBSQ HOSP IP/OBS SF/LOW 25: CPT | Performed by: PHYSICIAN ASSISTANT

## 2018-09-24 RX ADMIN — POTASSIUM CHLORIDE, DEXTROSE MONOHYDRATE AND SODIUM CHLORIDE: 150; 5; 450 INJECTION, SOLUTION INTRAVENOUS at 10:34

## 2018-09-24 RX ADMIN — POTASSIUM CHLORIDE, DEXTROSE MONOHYDRATE AND SODIUM CHLORIDE: 150; 5; 450 INJECTION, SOLUTION INTRAVENOUS at 20:55

## 2018-09-24 RX ADMIN — POTASSIUM CHLORIDE, DEXTROSE MONOHYDRATE AND SODIUM CHLORIDE: 150; 5; 450 INJECTION, SOLUTION INTRAVENOUS at 00:18

## 2018-09-24 ASSESSMENT — ACTIVITIES OF DAILY LIVING (ADL)
ADLS_ACUITY_SCORE: 9

## 2018-09-24 NOTE — PLAN OF CARE
Problem: Patient Care Overview  Goal: Plan of Care/Patient Progress Review  Outcome: Improving  Patient resting comfortably in bed. Vital signs stable. Alert/oriented x4. Denies pain/discomfort. Ambulating independently. Voiding without difficulty. Hypoactive bowel sounds. Patient reports passing gas. NG to LIS with moderate amount of green/brown output. IVF infusing. Continue supportive cares.

## 2018-09-24 NOTE — PLAN OF CARE
Problem: Patient Care Overview  Goal: Plan of Care/Patient Progress Review  Outcome: No Change  A&O x 4, independent, NPO, LS clear, IV fluids 100 mL/hr, NG tube, no pain, ambulated in hallway, will continue to monitor.

## 2018-09-24 NOTE — PROGRESS NOTES
[facility]   General Surgery Progress Note         Assessment and Plan:   Assessment:   Admission for SBO, conservative management      Plan:   -Await morning AXR  -Trial NGT clamping, possible removal later today  -Continue NPO  -Increase activity as able  -Await further return of bowel function         Interval History:   Sitting up in chair, doing well. Denies pain, has not needed pain medication. Started passing flatus yesterday and continues this morning. He wants the NGT removed. He is up walking. Voiding independently.          Physical Exam:   Blood pressure 106/58, pulse 86, temperature 97.7  F (36.5  C), temperature source Oral, resp. rate 16, weight 95.6 kg (210 lb 12.8 oz), SpO2 98 %.    I/O last 3 completed shifts:  In: 3054.33 [I.V.:3054.33]  Out: 400 [Emesis/NG output:400]    Abdomen: soft, very mildly distended, NT, +BS           Data:     Recent Labs   Lab Test  09/24/18   0624  09/22/18   2301  05/16/16   1920   HGB  14.1  16.1  14.4   WBC  13.0*  19.5*  15.3*          Lc Diallo PA-C

## 2018-09-24 NOTE — PROGRESS NOTES
[facility]  Hospitalist Progress Note  Name: Bryant Crawford    MRN: [id number]  Provider:  Dexter Washington MD  09/24/18    Initial presenting complaint/issue to hospital (Diagnosis): SBO.         Assessment and Plan:      Summary of Stay: Bryant Crawford is a 83 year old male admitted on 9/22/2018 with history of hypertension, CAD status post PCI in 1995 who presented on 9/23/2018 with abdominal pain and was found to have small bowel obstruction.       Problem List:     Small bowel obstruction: Patient developed acute onset abdominal pain and distention 14 hours prior to admission.  CT showed multiple dilated loops of small bowel with transition point in the ileum concerning for mechanical small bowel obstruction.  Patient has no history of any abdominal surgeries in the past, no history of bowel obstruction or bowel disease however chart review indicates prior bowel obstruction in 2008 and history of intermittent bowel obstructions thought to be secondary to colitis or enteritis are some congenital abnormality.  Surgery was consulted from the ER and recommended conservative management  -- NG clamped and tolerating well.  -- AXR today.  -- Surgery following.     Hypertension: Hold lisinopril 40 mg daily and hydrochlorothiazide 25 mg  History of coronary disease status post PCI to LAD in 1995: Holding all oral medications.  Not on a beta-blocker     DVT Prophylaxis: Pneumatic Compression Devices  Discharge Dispo: home  Estimated Disch Date / # of Days until Disch: 1 day  Full Code    # Pain Assessment:  Current Pain Score 9/24/2018   Patient currently in pain? denies   Pain score (0-10) -   Bryant monreal pain level was assessed and he currently denies pain.                Interval History:        No chest pain/SOB/N/V/fever/chills. No abdominal pain. + flatus.                  Physical Exam:      Last Vital Signs:  Temp: 97.7  F (36.5  C) Temp src: Oral BP: 134/60   Heart Rate: 81 Resp: 16 SpO2: 90 % O2  Device: None (Room air)      Intake/Output Summary (Last 24 hours) at 09/24/18 0736  Last data filed at 09/24/18 0018   Gross per 24 hour   Intake          3054.33 ml   Output              400 ml   Net          2654.33 ml     I/O last 3 completed shifts:  In: 3054.33 [I.V.:3054.33]  Out: 400 [Emesis/NG output:400]  Vitals:    09/22/18 2351 09/23/18 0251 09/24/18 0415   Weight: 95.3 kg (210 lb) 95.6 kg (210 lb 12.8 oz) 95.6 kg (210 lb 12.8 oz)       Gen - AAO x 3 in NAD.  Lungs - CTA B.  Heart - RR,S1+S2 nml, no m/g/r.  Abd - soft, NT, ND, + BS.  Ext - no edema.         Medications:      All current medications were reviewed.         Data:      All new lab and imaging data was reviewed.   Labs:  No results for input(s): CULT in the last 168 hours.    Recent Labs  Lab 09/24/18  0624 09/22/18  2301   WBC 13.0* 19.5*   HGB 14.1 16.1   HCT 42.9 47.7   MCV 98 95    295       Recent Labs  Lab 09/24/18  0624 09/22/18  2301    136   POTASSIUM 4.2 3.9   CHLORIDE 105 101   CO2 30 26   ANIONGAP 4 9   * 163*   BUN 12 19   CR 1.21 1.23   GFRESTIMATED 58* 56*   GFRESTBLACK 70 68   ALIYA 8.4* 9.8   PROTTOTAL 6.0* 7.5   ALBUMIN 3.2* 4.3   BILITOTAL 0.6 0.7   ALKPHOS 43 54   AST 11 19   ALT 15 24      Recent Imaging:   No results found for this or any previous visit (from the past 24 hour(s)).

## 2018-09-24 NOTE — PLAN OF CARE
Problem: Patient Care Overview  Goal: Plan of Care/Patient Progress Review  Outcome: Improving  A&Ox4. VSS. /hr. Up - independently. NG tube clamped for trial of 4 hrs - residual checked for 0 mL - NG tube now removed. Denies pain and nausea. One bowel movement this afternoon. Diet - clear liquid. Possible discharge in 1 day.

## 2018-09-25 VITALS
HEART RATE: 86 BPM | OXYGEN SATURATION: 99 % | SYSTOLIC BLOOD PRESSURE: 142 MMHG | TEMPERATURE: 98.2 F | WEIGHT: 210.2 LBS | DIASTOLIC BLOOD PRESSURE: 66 MMHG | RESPIRATION RATE: 18 BRPM

## 2018-09-25 PROCEDURE — 25800025 ZZH RX 258: Performed by: HOSPITALIST

## 2018-09-25 PROCEDURE — 99238 HOSP IP/OBS DSCHRG MGMT 30/<: CPT | Performed by: INTERNAL MEDICINE

## 2018-09-25 PROCEDURE — 99231 SBSQ HOSP IP/OBS SF/LOW 25: CPT | Performed by: PHYSICIAN ASSISTANT

## 2018-09-25 RX ADMIN — POTASSIUM CHLORIDE, DEXTROSE MONOHYDRATE AND SODIUM CHLORIDE: 150; 5; 450 INJECTION, SOLUTION INTRAVENOUS at 06:46

## 2018-09-25 ASSESSMENT — ACTIVITIES OF DAILY LIVING (ADL)
ADLS_ACUITY_SCORE: 9

## 2018-09-25 NOTE — PROGRESS NOTES
[facility]   General Surgery Progress Note         Assessment and Plan:   Assessment:   Admission for SBO, conservative management  resolved      Plan:   -Diet advancement per hospitalist  -IF tolerates advancement to low residue, OK to discharge today from a surgical standpoint per hospitalist.   -Discharge instructions reviewed. No need for general surgery followup.         Interval History:   Up walking in room. Denies pain. Tolerating full liquid diet, denies nausea/vomiting or bloating. +flatus, +BM. Up walking.          Physical Exam:   Blood pressure 142/66, pulse 86, temperature 98.2  F (36.8  C), temperature source Oral, resp. rate 18, weight 95.3 kg (210 lb 3.2 oz), SpO2 99 %.    I/O last 3 completed shifts:  In: 1914 [P.O.:400; I.V.:1514]  Out: -     Abdomen: soft, non distended, NT, +BS           Data:     Recent Labs   Lab Test  09/24/18   0624  09/22/18   2301  05/16/16   1920   HGB  14.1  16.1  14.4   WBC  13.0*  19.5*  15.3*       Tri Funk PA-C     Seen and agree,    Julian Jones MD  [facility]

## 2018-09-25 NOTE — PLAN OF CARE
A&O X4. Up independent, ambulating halls. VSS on RA. Denies pain, nausea. Tolerating low fiber diet, passing flatus with active bowel sounds. GI signed off. Plan to discharge this afternoon with no changes in medications. Education provided alongside follow up instructions. All questions answered, belongings sent.

## 2018-09-25 NOTE — PLAN OF CARE
Problem: Patient Care Overview  Goal: Plan of Care/Patient Progress Review  Outcome: Improving  21:25  Pain: No c/o pain.   LOC: alert and oriented  Mobility: Independent  Lungs: clear.  95% RA  Tele: No tele  GI: rounded abdomen.  Soft, non-tender.  + bowel sounds.  Passing gas.  Had a bowel movement on day shift today. Tolerated clear liquids for supper.   : voiding without difficulty  IV: PIV infusing maintenance fluids  Other: Probable discharge home tomorrow.

## 2018-09-25 NOTE — DISCHARGE SUMMARY
[facility]  Discharge Summary        Bryant Crawford MRN# [id number]   YOB: 1935 Age: 83 year old     Date of Admission:  9/22/2018  Date of Discharge:  9/25/2018  Admitting Physician:  Suzanna Hernandez MD  Discharge Physician: Dexter Washington MD  Discharging Service: Hospitalist     Primary Provider: Ariel Mario  Primary Care Physician Phone Number: [phone]         Discharge Diagnoses/Problem Oriented Hospital Course (Providers):    Bryant Crawford was admitted on 9/22/2018 by Suzanna Hernandez MD and I would refer you to their history and physical.  The following problems were addressed during his hospitalization:    Small bowel obstruction: Patient developed acute onset abdominal pain and distention 14 hours prior to admission.  CT showed multiple dilated loops of small bowel with transition point in the ileum concerning for mechanical small bowel obstruction.  Patient has no history of any abdominal surgeries in the past, no history of bowel obstruction or bowel disease however chart review indicates prior bowel obstruction in 2008 and history of intermittent bowel obstructions thought to be secondary to colitis or enteritis are some congenital abnormality.  Surgery was consulted from the ER and recommended conservative management  -- NG clamped 9/24 and tolerating well.  -- Advanced diet to full liquid and subsequently low residue.     Hypertension: Hold lisinopril 40 mg daily and hydrochlorothiazide 25 mg  History of coronary disease status post PCI to LAD in 1995: Holding all oral medications.  Not on a beta-blocker     Admitted as inpatient. Rx conservatively with NG to LIS, IVF's and IV pain meds. Symptoms improved, with + BM's. Tolerating PO. F/u with PCP for liver cyst and small bowel follow through.        Code Status:      Full Code        Brief Hospital Stay Summary Sent Home With Patient in AVS:                Important Results:      CT A/P.          Pending Results:        Unresulted Labs Ordered in the Past 30 Days of this Admission     No orders found from 7/24/2018 to 9/23/2018.            Discharge Instructions and Follow-Up:      Follow-up Appointments     Follow-up and recommended labs and tests        Follow up with primary care provider, Ariel Mario, within 7 days for   hospital follow- up.  The following labs/tests are recommended: small   bowel follow through.                      Discharge Disposition:      Discharged to home         Discharge Medications:        Current Discharge Medication List      CONTINUE these medications which have NOT CHANGED    Details   aspirin 81 MG EC tablet Take 81 mg by mouth daily      hydrochlorothiazide (HYDRODIURIL) 25 MG tablet Take 25 mg by mouth daily.      lisinopril (PRINIVIL,ZESTRIL) 40 MG tablet Take 40 mg by mouth daily.               Allergies:       No Known Allergies        Consultations This Hospital Stay:      Consultation during this admission received from surgery         Condition and Physical on Discharge:      Discharge condition: Stable   Vitals: Blood pressure 142/66, pulse 86, temperature 98.2  F (36.8  C), temperature source Oral, resp. rate 18, weight 95.3 kg (210 lb 3.2 oz), SpO2 99 %.  210 lbs 3.2 oz      Gen - AAO x 3 in NAD.  Lungs - CTA B.  Heart - RR,S1+S2 nml, no m/g/r.  Abd - soft, NT, ND, + BS.  Ext - no edema.         Discharge Time:        < 30 mins.        Image Results From This Hospital Stay (For Non-EPIC Providers):        Results for orders placed or performed during the hospital encounter of 09/22/18   Abd/pelvis CT,  IV  contrast only TRAUMA / AAA    Narrative    CT ABDOMEN/PELVIS WITH CONTRAST   9/23/2018 12:37 AM     HISTORY: Lower abdominal pain x12 hours, nausea and distention.     TECHNIQUE: CT images of the abdomen and pelvis following nonionic  intravenous contrast, 100 mL Isovue-370. Radiation dose for this scan  was reduced using automated exposure control,  adjustment of the mA  and/or kV according to patient size, or iterative reconstruction  technique.    COMPARISON: 11/13/2008.    FINDINGS: 3 mm nodule at the left lung base is unchanged. Low-density  lesion in the left lobe of the liver measures 1.3 cm (series 2, image  17) and is in the area of a large cyst on the prior study. No other  liver lesions. There is trace perihepatic ascites. The gallbladder is  decompressed. The spleen, pancreas, and right adrenal gland are  unremarkable. Subcentimeter nodular thickening of the left adrenal  gland is stable. No hydronephrosis of either kidney. Several  parapelvic cysts are noted. No evidence of solid renal mass. No  abdominal or retroperitoneal lymphadenopathy.    There are multiple dilated loops of small bowel. The colon is  decompressed. There is transition from dilated to nondilated small  bowel near the inferior tip of the liver (series 2, images 38-45).    There is sigmoid diverticulosis without evidence of diverticulitis. No  pelvic lymphadenopathy or free fluid. The prostate is enlarged.      Impression    IMPRESSION:  1. Mechanical small bowel obstruction with a transition point in the  ileum, near the inferior tip of the liver. The small bowel mucosa  immediately distal to the transition point is thickened. Small bowel  thickening at this point is nonspecific and may be related to edema or  enteritis. An obstructive lesion such as small bowel lymphoma cannot  be excluded.  2. Low-density lesion in the left lobe of the liver may be the small  residual of a previous cyst in this region. Consider further  evaluation with ultrasound to rule out a solid liver lesion.    ANGELIA CHAMPAGNE MD   XR Abdomen 2 Views    Narrative    XR ABDOMEN 2 VW 9/24/2018 9:32 AM    HISTORY: Follow-up small bowel obstruction.    COMPARISON: 11/16/2008    FINDINGS: No evidence of free intraperitoneal air. The bowel gas  pattern is nonobstructive.      Impression    IMPRESSION:  Nonobstructive gas pattern.    ALEX GONZALEZ MD           Most Recent Lab Results In EPIC (For Non-EPIC Providers):      Results for orders placed or performed during the hospital encounter of 09/22/18 (from the past 24 hour(s))   XR Abdomen 2 Views    Narrative    XR ABDOMEN 2 VW 9/24/2018 9:32 AM    HISTORY: Follow-up small bowel obstruction.    COMPARISON: 11/16/2008    FINDINGS: No evidence of free intraperitoneal air. The bowel gas  pattern is nonobstructive.      Impression    IMPRESSION: Nonobstructive gas pattern.    ALEX GONZALEZ MD       # Discharge Pain Plan:   - Patient currently has NO PAIN and is not being prescribed pain medications on discharge.

## 2018-09-25 NOTE — DISCHARGE INSTRUCTIONS
"HOME CARE FOLLOWING BOWEL OBSTRUCTION ADMISSION  TUSHAR Golden, VERA Germain, OTF Katz     ACTIVITY:  Light Activity -- you may immediately be up and about as tolerated.  Driving -- you may drive when comfortable and off narcotic pain medications.  Light Work -- resume when comfortable off pain medications.  (If you can drive, you probably can work.)  Strenuous Work/Activity -- limit lifting to 15 pounds for 1-2 weeks.  Then, progressively increase with time.  Active Sports (running, biking, etc.) -- cautiously resume after 4 weeks, or when cleared by your surgeon.    DISCOMFORT:  Expect gradual improvement of residual abdominal soreness as your bowel function continues to return to normal over the following 1-2 weeks.  Please contact the office if you have worsening of abdominal pain, or onset of nausea/vomiting.    DIET:  Continue on a \"soft\" diet (i.e. cooked vegetables, soups, processed meats, light/white bread, mashed potatoes, rice, yogurt) for the first one to two week after discharge from the hospital.  After this time, you may return to diet you were on before surgery minimizing high cellulose foods and foods with \"skins\" (i.e.grapes, apple, citrus, corn) only.  This would include limiting: brussel sprouts, cabbage & kale, alfalfa sprouts, zucchini, squash, potatoes, carrots, and yams.  Drink plenty of fluids.    SURGEON CONTACT/APPOINTMENTS:  Office Phone:  [phone]     CONTACT US IF THE FOLLOWING DEVELOPS:   1. A fever that is above 101     2. Severe pain that is not relieved by your prescription.        If you have other questions, please call the office Monday thru Friday between 8am and 5pm to discuss with the nurse or physician assistant.  #[phone]    There is a surgeon ON CALL on weekday evenings and over the weekend in case of urgent need only, and may be contacted at the same number.    If you are having an emergency, call 911 or proceed to your nearest " emergency department.

## 2018-09-25 NOTE — PLAN OF CARE
Problem: Patient Care Overview  Goal: Plan of Care/Patient Progress Review  Outcome: Improving  A&O x 4, independent, clear liquid diet, IV fluids 100 mL/hr, no complaints of pain, plans to discharge today, will continue to monitor.

## 2020-08-19 ENCOUNTER — OFFICE VISIT (OUTPATIENT)
Dept: URBAN - METROPOLITAN AREA CLINIC 29 | Facility: CLINIC | Age: 85
End: 2020-08-19
Payer: MEDICARE

## 2020-08-19 DIAGNOSIS — H26.492 OTHER SECONDARY CATARACT, LEFT EYE: Primary | ICD-10-CM

## 2020-08-19 PROCEDURE — 99204 OFFICE O/P NEW MOD 45 MIN: CPT | Performed by: OPHTHALMOLOGY

## 2020-08-19 ASSESSMENT — INTRAOCULAR PRESSURE
OS: 14
OD: 13

## 2020-09-24 ENCOUNTER — SURGERY (OUTPATIENT)
Dept: URBAN - METROPOLITAN AREA SURGERY 11 | Facility: SURGERY | Age: 85
End: 2020-09-24
Payer: MEDICARE

## 2020-09-24 PROCEDURE — 66821 AFTER CATARACT LASER SURGERY: CPT | Performed by: OPHTHALMOLOGY

## 2020-10-01 ENCOUNTER — POST-OPERATIVE VISIT (OUTPATIENT)
Dept: URBAN - METROPOLITAN AREA CLINIC 29 | Facility: CLINIC | Age: 85
End: 2020-10-01
Payer: MEDICARE

## 2020-10-01 DIAGNOSIS — Z48.810 ENCOUNTER FOR SURGICAL AFTERCARE FOLLOWING SURGERY ON A SENSE ORGAN: Primary | ICD-10-CM

## 2020-10-01 PROCEDURE — 99024 POSTOP FOLLOW-UP VISIT: CPT | Performed by: OPTOMETRIST

## 2020-10-01 ASSESSMENT — INTRAOCULAR PRESSURE
OS: 14
OD: 14

## 2020-10-01 NOTE — IMPRESSION/PLAN
Impression: S/P YAG PC OS - 7 Days. Encounter for surgical aftercare following surgery on a sense organ  Z48.810.  Plan:

## 2021-12-14 ENCOUNTER — OFFICE VISIT (OUTPATIENT)
Dept: URBAN - METROPOLITAN AREA CLINIC 28 | Facility: CLINIC | Age: 86
End: 2021-12-14
Payer: MEDICARE

## 2021-12-14 DIAGNOSIS — H35.363 DRUSEN (DEGENERATIVE) OF MACULA, BILATERAL: Primary | ICD-10-CM

## 2021-12-14 PROCEDURE — 92014 COMPRE OPH EXAM EST PT 1/>: CPT | Performed by: OPTOMETRIST

## 2021-12-14 ASSESSMENT — INTRAOCULAR PRESSURE
OS: 17
OD: 17

## 2021-12-14 NOTE — IMPRESSION/PLAN
Impression: Drusen (degenerative) of macula, bilateral: H35.363. Condition: will continue to monitor. Plan: Discussed diagnosis in detail with patient. Discussed treatment options with patient. Use of vitamins has shown to improve the effects of ARMD, gave sample of AREDS 2 to patient. Use of Amsler grid was explained. Will continue to observe condition and or symptoms. Discussed risks of progression. Call if [address] worsens.